# Patient Record
Sex: MALE | Race: BLACK OR AFRICAN AMERICAN | Employment: FULL TIME | RURAL
[De-identification: names, ages, dates, MRNs, and addresses within clinical notes are randomized per-mention and may not be internally consistent; named-entity substitution may affect disease eponyms.]

---

## 2021-01-15 ENCOUNTER — OFFICE VISIT (OUTPATIENT)
Dept: PRIMARY CARE CLINIC | Age: 51
End: 2021-01-15

## 2021-01-15 DIAGNOSIS — Z20.822 ENCOUNTER FOR LABORATORY TESTING FOR COVID-19 VIRUS: Primary | ICD-10-CM

## 2021-01-17 LAB — SARS-COV-2, NAA: NOT DETECTED

## 2021-01-18 ENCOUNTER — DOCUMENTATION ONLY (OUTPATIENT)
Dept: PRIMARY CARE CLINIC | Age: 51
End: 2021-01-18

## 2022-06-29 ENCOUNTER — APPOINTMENT (OUTPATIENT)
Dept: CT IMAGING | Age: 52
End: 2022-06-29
Attending: STUDENT IN AN ORGANIZED HEALTH CARE EDUCATION/TRAINING PROGRAM
Payer: MEDICAID

## 2022-06-29 ENCOUNTER — HOSPITAL ENCOUNTER (EMERGENCY)
Age: 52
Discharge: HOME | End: 2022-06-29
Attending: EMERGENCY MEDICINE
Payer: MEDICAID

## 2022-06-29 VITALS
DIASTOLIC BLOOD PRESSURE: 82 MMHG | RESPIRATION RATE: 18 BRPM | WEIGHT: 182.1 LBS | SYSTOLIC BLOOD PRESSURE: 137 MMHG | BODY MASS INDEX: 29.27 KG/M2 | TEMPERATURE: 98.2 F | HEART RATE: 94 BPM | HEIGHT: 66 IN | OXYGEN SATURATION: 100 %

## 2022-06-29 DIAGNOSIS — K08.89 SUBLUXATION OF TOOTH: ICD-10-CM

## 2022-06-29 DIAGNOSIS — S40.022A SUPERFICIAL BRUISING OF ARM, LEFT, INITIAL ENCOUNTER: ICD-10-CM

## 2022-06-29 DIAGNOSIS — S40.021A SUPERFICIAL BRUISING OF ARM, RIGHT, INITIAL ENCOUNTER: ICD-10-CM
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
## 2022-06-29 DIAGNOSIS — T14.8XXA ABRASION: ICD-10-CM

## 2022-06-29 PROCEDURE — 99284 EMERGENCY DEPT VISIT MOD MDM: CPT

## 2022-06-29 PROCEDURE — 74011250637 HC RX REV CODE- 250/637: Performed by: EMERGENCY MEDICINE

## 2022-06-29 PROCEDURE — 75810000275 HC EMERGENCY DEPT VISIT NO LEVEL OF CARE

## 2022-06-29 PROCEDURE — 70450 CT HEAD/BRAIN W/O DYE: CPT

## 2022-06-29 RX ORDER — IBUPROFEN 400 MG/1
800 TABLET ORAL
Status: COMPLETED | OUTPATIENT
Start: 2022-06-29 | End: 2022-06-29

## 2022-06-29 RX ORDER — IBUPROFEN 800 MG/1
800 TABLET ORAL
Qty: 20 TABLET | Refills: 0 | Status: SHIPPED | OUTPATIENT
Start: 2022-06-29 | End: 2022-07-06

## 2022-06-29 RX ADMIN — IBUPROFEN 800 MG: 400 TABLET, FILM COATED ORAL at 20:14
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
## 2022-06-30 NOTE — FORENSIC NURSE
Forensic evaluation and photographs completed. Patient tolerated well. Patient denies any safety concerns at this time. Discussed findings with Dr. Cely Ludwig using SBAR. Care of patient returned to ED staff.
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
## 2022-06-30 NOTE — DISCHARGE INSTRUCTIONS
It was a pleasure taking care of you at [facility] today. We know that when you come to [facility], you are entrusting us with your health, comfort, and safety. Our physicians and nurses honor that trust, and we truly appreciate the opportunity to care for you and your loved ones. We also value your feedback. If you receive a survey about your Emergency Department experience today, please fill it out. We care about our patients' feedback, and we listen to what you have to say. Thank you!